# Patient Record
Sex: MALE | Race: WHITE | NOT HISPANIC OR LATINO | Employment: FULL TIME | URBAN - METROPOLITAN AREA
[De-identification: names, ages, dates, MRNs, and addresses within clinical notes are randomized per-mention and may not be internally consistent; named-entity substitution may affect disease eponyms.]

---

## 2021-08-17 ENCOUNTER — IMMUNIZATION (OUTPATIENT)
Dept: FAMILY MEDICINE | Facility: CLINIC | Age: 56
End: 2021-08-17
Payer: OTHER GOVERNMENT

## 2021-08-17 DIAGNOSIS — Z23 NEED FOR VACCINATION: Primary | ICD-10-CM

## 2021-08-17 PROCEDURE — 0031A COVID-19,VECTOR-NR,RS-AD26,PF,0.5 ML DOSE VACCINE (JANSSEN): CPT | Mod: CV19,,, | Performed by: RADIOLOGY

## 2021-08-17 PROCEDURE — 91303 COVID-19,VECTOR-NR,RS-AD26,PF,0.5 ML DOSE VACCINE (JANSSEN): ICD-10-PCS | Mod: ,,, | Performed by: RADIOLOGY

## 2021-08-17 PROCEDURE — 0031A COVID-19,VECTOR-NR,RS-AD26,PF,0.5 ML DOSE VACCINE (JANSSEN): ICD-10-PCS | Mod: CV19,,, | Performed by: RADIOLOGY

## 2021-08-17 PROCEDURE — 91303 COVID-19,VECTOR-NR,RS-AD26,PF,0.5 ML DOSE VACCINE (JANSSEN): CPT | Mod: ,,, | Performed by: RADIOLOGY

## 2022-07-05 ENCOUNTER — OFFICE VISIT (OUTPATIENT)
Dept: UROLOGY | Facility: CLINIC | Age: 57
End: 2022-07-05
Payer: COMMERCIAL

## 2022-07-05 DIAGNOSIS — R31.29 MICROSCOPIC HEMATURIA: Primary | ICD-10-CM

## 2022-07-05 DIAGNOSIS — R97.20 ELEVATED PSA: ICD-10-CM

## 2022-07-05 LAB
BILIRUB SERPL-MCNC: ABNORMAL MG/DL
BLOOD URINE, POC: ABNORMAL
CLARITY, POC UA: CLEAR
COLOR, POC UA: YELLOW
GLUCOSE UR QL STRIP: ABNORMAL
KETONES UR QL STRIP: ABNORMAL
LEUKOCYTE ESTERASE URINE, POC: ABNORMAL
NITRITE, POC UA: ABNORMAL
PH, POC UA: 5.5
PROTEIN, POC: ABNORMAL
SPECIFIC GRAVITY, POC UA: 1
UROBILINOGEN, POC UA: 0.2

## 2022-07-05 PROCEDURE — 1159F PR MEDICATION LIST DOCUMENTED IN MEDICAL RECORD: ICD-10-PCS | Mod: CPTII,S$GLB,,

## 2022-07-05 PROCEDURE — 99203 OFFICE O/P NEW LOW 30 MIN: CPT | Mod: S$GLB,,,

## 2022-07-05 PROCEDURE — 81002 POCT URINE DIPSTICK WITHOUT MICROSCOPE: ICD-10-PCS | Mod: S$GLB,,,

## 2022-07-05 PROCEDURE — 88112 CYTOPATH CELL ENHANCE TECH: CPT | Mod: 26,,, | Performed by: PATHOLOGY

## 2022-07-05 PROCEDURE — 87086 URINE CULTURE/COLONY COUNT: CPT

## 2022-07-05 PROCEDURE — 88112 PR  CYTOPATH, CELL ENHANCE TECH: ICD-10-PCS | Mod: 26,,, | Performed by: PATHOLOGY

## 2022-07-05 PROCEDURE — 99999 PR PBB SHADOW E&M-NEW PATIENT-LVL II: CPT | Mod: PBBFAC,,,

## 2022-07-05 PROCEDURE — 99203 PR OFFICE/OUTPT VISIT, NEW, LEVL III, 30-44 MIN: ICD-10-PCS | Mod: S$GLB,,,

## 2022-07-05 PROCEDURE — 1159F MED LIST DOCD IN RCRD: CPT | Mod: CPTII,S$GLB,,

## 2022-07-05 PROCEDURE — 81002 URINALYSIS NONAUTO W/O SCOPE: CPT | Mod: S$GLB,,,

## 2022-07-05 PROCEDURE — 99999 PR PBB SHADOW E&M-NEW PATIENT-LVL II: ICD-10-PCS | Mod: PBBFAC,,,

## 2022-07-05 PROCEDURE — 88112 CYTOPATH CELL ENHANCE TECH: CPT | Performed by: PATHOLOGY

## 2022-07-05 NOTE — PROGRESS NOTES
[facility] Note  Staff: LOBO Rodriguez    PCP: None    Chief Complaint: Elevated PSA    Subjective:        HPI: Naman Oakes is a 56 y.o. male NEW PATIENT presents today to establish care and for evaluation of an elevated PSA. [place] and he [place] his labs with him. His PSA 12/2021 was 1.7 and then recently on 5/16/2022 was 5.94. He denies any urinary complaints such as dysuria, urgency, frequency, gross hematuria, and feeling of incomplete bladder emptying. He does have a family history of prostate cancer, his father. He also states at his last labs he had microscopic hematuria. He is currently not taking any bladder or prostate medications.     Questions asked the pt during ov today:  Urgency: No, urge incontinence? No  NTF: 1-2x night  Dysuria: No  Gross Hematuria:No  Straining:No, Hesistancy:No, Intermittency:No}, Weak stream:No    Last PSA Screening: No results found for: PSA, PSADIAG    History of Kidney Stones?:  No    Constipation issues?:  No    REVIEW OF SYSTEMS:  Review of Systems   Constitutional: Negative.  Negative for chills and fever.   HENT: Negative.    Eyes: Negative.    Respiratory: Negative.    Cardiovascular: Negative.    Gastrointestinal: Negative.  Negative for abdominal pain, nausea and vomiting.   Genitourinary: Negative.  Negative for dysuria, flank pain, frequency, hematuria and urgency.   Musculoskeletal: Negative.  Negative for back pain.   Skin: Negative.    Neurological: Negative.    Endo/Heme/Allergies: Negative.    Psychiatric/Behavioral: Negative.        PMHx:  History reviewed. No pertinent past medical history.    PSHx:  History reviewed. No pertinent surgical history.    Fam Hx:   malignancies: Yes - father prostate cancer    kidney stones: No     Soc Hx:  , lives in [place]    Allergies:  Patient has no known allergies.    Medications: reviewed     Objective:   There were no vitals filed for this  visit.    Physical Exam  Constitutional:       Appearance: Normal appearance.   HENT:      Head: Normocephalic.      Mouth/Throat:      Mouth: Mucous membranes are moist.   Eyes:      Conjunctiva/sclera: Conjunctivae normal.   Pulmonary:      Effort: Pulmonary effort is normal.   Abdominal:      General: There is no distension.      Palpations: Abdomen is soft.      Tenderness: There is no abdominal tenderness. There is no right CVA tenderness or left CVA tenderness.   Musculoskeletal:         General: Normal range of motion.      Cervical back: Normal range of motion.   Skin:     General: Skin is warm.   Neurological:      Mental Status: He is alert and oriented to person, place, and time.   Psychiatric:         Mood and Affect: Mood normal.         Behavior: Behavior normal.          EXAM performed by me in office today:  Inspection of anus and perineum normal  TYRONE: 30g prostate gland without nodules, masses, tenderness. SV not palpable. Normal sphincter tone.   No hemorrhoids visible    LABS REVIEW:  UA today:  Color:Clear, Yellow  Spec. Grav.  <1.005  PH  5.5  Negative for leukocytes, nitrates, protein, glucose, ketones, urobili, bili  Small blood    Assessment:       1. Microscopic hematuria    2. Elevated PSA          Plan:     1. With normal TYRONE, pt would like to observe now. Will repeat PSA in 6 months  2. Urine sent for culture and cytology for micro hematuria    F/u  6 months after PSA    [facility]:  Pending    LOBO Rodriguez

## 2022-07-06 LAB
FINAL PATHOLOGIC DIAGNOSIS: NORMAL
Lab: NORMAL

## 2022-07-07 LAB — BACTERIA UR CULT: NO GROWTH

## 2023-01-04 ENCOUNTER — LAB VISIT (OUTPATIENT)
Dept: LAB | Facility: HOSPITAL | Age: 58
End: 2023-01-04
Payer: COMMERCIAL

## 2023-01-04 DIAGNOSIS — R97.20 ELEVATED PSA: ICD-10-CM

## 2023-01-04 LAB — COMPLEXED PSA SERPL-MCNC: 1.5 NG/ML (ref 0–4)

## 2023-01-04 PROCEDURE — 36415 COLL VENOUS BLD VENIPUNCTURE: CPT | Mod: PO

## 2023-01-04 PROCEDURE — 84153 ASSAY OF PSA TOTAL: CPT

## 2024-01-16 ENCOUNTER — TELEPHONE (OUTPATIENT)
Dept: FAMILY MEDICINE | Facility: CLINIC | Age: 59
End: 2024-01-16
Payer: COMMERCIAL

## 2024-01-16 NOTE — TELEPHONE ENCOUNTER
----- Message from Jessica Alejandra sent at 1/16/2024  7:39 AM CST -----  Contact: self  Type:  Needs Medical Advice    Who Called: self  Symptoms (please be specific): needs to est care with pcp and needs a hosp f/u appt asap    Would the patient rather a call back or a response via [facility]? call  Best Call Back Number: [phone] (home)     Additional Information: please advise and thank you.

## 2024-01-23 ENCOUNTER — OFFICE VISIT (OUTPATIENT)
Dept: PAIN MEDICINE | Facility: CLINIC | Age: 59
End: 2024-01-23
Payer: COMMERCIAL

## 2024-01-23 ENCOUNTER — HOSPITAL ENCOUNTER (OUTPATIENT)
Dept: RADIOLOGY | Facility: HOSPITAL | Age: 59
Discharge: HOME | End: 2024-01-23
Attending: ANESTHESIOLOGY
Payer: COMMERCIAL

## 2024-01-23 VITALS
HEIGHT: 70 IN | DIASTOLIC BLOOD PRESSURE: 89 MMHG | WEIGHT: 186.81 LBS | HEART RATE: 76 BPM | SYSTOLIC BLOOD PRESSURE: 149 MMHG | BODY MASS INDEX: 26.75 KG/M2

## 2024-01-23 DIAGNOSIS — M54.16 LUMBAR RADICULOPATHY: Primary | ICD-10-CM

## 2024-01-23 DIAGNOSIS — M54.16 LUMBAR RADICULOPATHY: ICD-10-CM

## 2024-01-23 PROCEDURE — 72114 X-RAY EXAM L-S SPINE BENDING: CPT | Mod: 26,,, | Performed by: RADIOLOGY

## 2024-01-23 PROCEDURE — 72114 X-RAY EXAM L-S SPINE BENDING: CPT | Mod: TC,PO

## 2024-01-23 PROCEDURE — 3008F BODY MASS INDEX DOCD: CPT | Mod: CPTII,S$GLB,, | Performed by: ANESTHESIOLOGY

## 2024-01-23 PROCEDURE — 99204 OFFICE O/P NEW MOD 45 MIN: CPT | Mod: S$GLB,,, | Performed by: ANESTHESIOLOGY

## 2024-01-23 PROCEDURE — 1159F MED LIST DOCD IN RCRD: CPT | Mod: CPTII,S$GLB,, | Performed by: ANESTHESIOLOGY

## 2024-01-23 PROCEDURE — 3079F DIAST BP 80-89 MM HG: CPT | Mod: CPTII,S$GLB,, | Performed by: ANESTHESIOLOGY

## 2024-01-23 PROCEDURE — 1160F RVW MEDS BY RX/DR IN RCRD: CPT | Mod: CPTII,S$GLB,, | Performed by: ANESTHESIOLOGY

## 2024-01-23 PROCEDURE — 3077F SYST BP >= 140 MM HG: CPT | Mod: CPTII,S$GLB,, | Performed by: ANESTHESIOLOGY

## 2024-01-23 PROCEDURE — 99999 PR PBB SHADOW E&M-EST. PATIENT-LVL III: CPT | Mod: PBBFAC,,, | Performed by: ANESTHESIOLOGY

## 2024-01-23 RX ORDER — METHYLPREDNISOLONE 4 MG/1
TABLET ORAL
Qty: 1 EACH | Refills: 0 | Status: SHIPPED | OUTPATIENT
Start: 2024-01-23 | End: 2024-02-13

## 2024-01-23 NOTE — PROGRESS NOTES
"[facility] New Patient Evaluation      Referred by: Russ Alba    PCP:     CC:   Chief Complaint   Patient presents with    Leg Pain    Low-back Pain          1/23/2024    10:10 AM   Last 3 PDI Scores   Pain Disability Index (PDI) 30         HPI:   Alvin Oakes is a 58 y.o. male patient who has no past medical history on file. He presents with back pain.    He reports the pain started shortly after he was plunging a toilet for his mother  about 10 days ago.Since that time he has had pain radiating down the left leg past the left knee the left foot.  Today his pain is 6/10, constant, burning, tight, tingling, sharp.  He denies any numbness or weakness.  His pain is worse with sitting, standing, lying, walking and not relieved with anything specific.  He has been taking ibuprofen with some very mild relief.      Pain Intervention History:      Past Spine Surgical History:      Past and current medications:  Antineuropathics:  NSAIDs: ibuprofen   Physical therapy:  Antidepressants:  Muscle relaxers:  Opioids:  Antiplatelets/Anticoagulants:    History:    Current Outpatient Medications:     ibuprofen (ADVIL,MOTRIN) 600 MG tablet, Take 1 tablet (600 mg total) by mouth every 6 (six) hours as needed for Pain., Disp: 20 tablet, Rfl: 0    methylPREDNISolone (MEDROL DOSEPACK) 4 mg tablet, use as directed, Disp: 1 each, Rfl: 0    History reviewed. No pertinent past medical history.    History reviewed. No pertinent surgical history.    History reviewed. No pertinent family history.    Social History     Socioeconomic History    Marital status:    Social History Narrative    ** Merged History Encounter **            Review of patient's allergies indicates:  No Known Allergies    Review of Systems:  12 point review of systems is negative.    Physical Exam:  Vitals:    01/23/24 1012   BP: (!) 149/89   Pulse: 76   Weight: 84.8 kg (186 lb 13.4 oz)   Height: 5' 10" (1.778 m)   PainSc:   6   PainLoc: Back " "    Body mass index is 26.81 kg/m².    Gen: NAD  Psych: mood appropriate for given condition  HEENT: eyes anicteric   CV: RRR  HEENT: anicteric   Respiratory: non-labored, no signs of respiratory distress  Abd: non-distended  Skin: warm, dry and intact.  Gait: No antalgic gait.     Sensory:  Intact and symmetrical to light touch in L1-S1 dermatomes bilaterally.    Motor:     Right Left   L2/3 Iliacus Hip flexion  5  5   L3/4 Qudratus Femoris Knee Extension  5  5   L4/5 Tib Anterior Ankle Dorsiflexion   5  5   L5/S1 Extensor Hallicus Longus Great toe extension  5  5   S1/S2 Gastroc/Soleus Plantar Flexion  5  5      Right Left                  Patellar DTR 2+ 0   Achilles DTR 2+ 2+                      Labs:  No results found for: "LABA1C", "HGBA1C"    No results found for: "WBC", "HGB", "HCT", "MCV", "PLT"        Imaging:  CT lumbar spine 1/13/24  FINDINGS:  Minimal grade 1 anterolisthesis of L3 on L4, 2 mm.  Minimal grade 1 retrolisthesis of L5 on S1, 1-2 mm.  Vertebral body heights are maintained.  No acute fracture or osseous destructive process.  Small Schmorl's nodes noted at several levels.  Mild disc height loss at several levels, most advanced at L3-L4.  Multilevel lumbar spondylosis with small posterior disc bulges and facet arthropathy.  Findings result in mild spinal canal stenosis at L3-L4 and mild bilateral neural foraminal narrowing from L3-L4 to L5-S1.     Paraspinal soft tissues demonstrate no acute abnormality.  Colonic diverticulosis noted.    U/S LLE 1/13/24  Impression:  No evidence of deep venous thrombosis in the left lower extremity.    Assessment:   Problem List Items Addressed This Visit    None  Visit Diagnoses       Lumbar radiculopathy    -  Primary    Relevant Medications    methylPREDNISolone (MEDROL DOSEPACK) 4 mg tablet    Other Relevant Orders    X-Ray Lumbar Complete Including Flex And Ext              Alvin Oakes is a 58 y.o. male patient who has no past medical history on file. " He presents with back pain.    He reports the pain started shortly after he was plunging a toilet for his mother  about 10 days ago.Since that time he has had pain radiating down the left leg past the left knee the left foot.  Today his pain is 6/10, constant, burning, tight, tingling, sharp.  He denies any numbness or weakness.  His pain is worse with sitting, standing, lying, walking and not relieved with anything specific.  He has been taking ibuprofen with some very mild relief.    -   On exam he has full strength in his lower extremities and intact sensation to light touch bilateral L2-S1.  He has an absent left patellar DTR  -  he went to the ER if her symptoms and at that time they got an ultrasound of the left lower extremity was negative for DVT.  They also going to CT of his lumbar spine that shows some small grade 1 listhesis of L3 on 4 and L5 on S1.  I see multilevel bilateral facet arthropathy.  He may have some mild bilateral foraminal narrowing.  Central canal is difficult to assess but I do not see any obvious disc extrusion  -  I have ordered a lumbar x-ray with flexion-extension review his bony anatomy and rule out any instability of his listhesis  -  I think he is likely suffering from a lumbar radiculopathy due to a bulging disc.  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  He is unable to start a formal physical therapy regimen at 1 location.  I have provided him with physician directed home exercises to start.  I have also prescribed him a Medrol Dosepak for inflammatory pain.  We will have him follow up in 6 weeks.  If he fails to find significant relief then I am going to get a lumbar MRI      : Not applicable    Isauro Macdonald M.D.  Interventional Pain Medicine / Anesthesiology    This note was completed with dictation software and grammatical errors may exist.

## 2024-01-25 ENCOUNTER — TELEPHONE (OUTPATIENT)
Dept: PAIN MEDICINE | Facility: CLINIC | Age: 59
End: 2024-01-25
Payer: COMMERCIAL

## 2024-01-25 NOTE — TELEPHONE ENCOUNTER
----- Message from Brooklynn Reynoso sent at 1/23/2024  4:02 PM CST -----  Regarding: Results  Type:  Test Results    Who Called: Pt    Name of Test (Lab/Mammo/Etc): Xray    Date of Test: 1/23/2024    Ordering Provider: Renae LEAL    Where the test was performed: [facility]     Would the patient rather a call back or a response via [facility]? Call back    Best Call Back Number: [phone]    Additional Information:  Pt would like a call back to discuss his results for xray ------------------- Thank you

## 2024-01-25 NOTE — TELEPHONE ENCOUNTER
----- Message from Luz Marina Carbajal sent at 1/23/2024 12:55 PM CST -----  Contact: patient  Type:  Needs Medical Advice    Who Called: patient     Would the patient rather a call back or a response via [facility]? Call     Best Call Back Number: [phone] (home)      Additional Information: Patient would like to speak with the nurse in regards to a medication question.     Please call to advise

## 2024-02-01 ENCOUNTER — TELEPHONE (OUTPATIENT)
Dept: PAIN MEDICINE | Facility: CLINIC | Age: 59
End: 2024-02-01
Payer: COMMERCIAL

## 2024-02-01 DIAGNOSIS — M54.9 DORSALGIA, UNSPECIFIED: Primary | ICD-10-CM

## 2024-02-01 NOTE — TELEPHONE ENCOUNTER
He has degenerative changes on his x-ray but nothing alarming.  Our plan was for him to maintain home exercises to see if he had improvement in his pain if you need do not we will get a lumbar MRI.  If he is still having significant pain we can go ahead and get his lumbar imaging.  Order placed.

## 2024-02-01 NOTE — TELEPHONE ENCOUNTER
----- Message from Joanne Trinh sent at 1/29/2024  9:03 AM CST -----  Contact: self  Type: Sooner Appointment Request        Caller is requesting a sooner appointment. Caller declined first available appointment listed below. Caller will not accept being placed on the waitlist and is requesting a message be sent to doctor.        Name of Caller: Patient   Best Call Back Number: [phone]  Additional Information: Pt would like a call about his Last X ra y taht was brandone. Plz explain results and advise. Pt finished medicine he was given on his last visit as well still having pain after finishing  medication. Thanks

## 2024-02-16 ENCOUNTER — TELEPHONE (OUTPATIENT)
Dept: FAMILY MEDICINE | Facility: CLINIC | Age: 59
End: 2024-02-16
Payer: COMMERCIAL

## 2024-02-16 NOTE — TELEPHONE ENCOUNTER
----- Message from Jessica Alejandra sent at 2/15/2024  7:30 AM CST -----  Contact: self  Type:  Needs Medical Advice    Who Called: self  Symptoms (please be specific): needs to see a dr for a bp check. Pt will be new. He would lik to be seen next week.   Would the patient rather a call back or a response via [facility]? call  Best Call Back Number: [phone] (home)     Additional Information: please advise and thank you.

## 2024-02-16 NOTE — TELEPHONE ENCOUNTER
----- Message from Talia Light sent at 2/16/2024 11:12 AM CST -----  Regarding: Returning call  Type:  Patient Returning Call    Who Called:  Alvin  Who Left Message for Patient:  Violet  Does the patient know what this is regarding?:  sooner appt  Best Call Back Number:  [phone]    Additional Information:

## 2024-02-16 NOTE — TELEPHONE ENCOUNTER
----- Message from Dustin Ryan sent at 2/16/2024  9:04 AM CST -----  Type:  Sooner Appointment Request    Caller is requesting a sooner appointment.  Caller declined first available appointment listed below.  Caller will not accept being placed on the waitlist and is requesting a message be sent to doctor.    Name of Caller:  pt  When is the first available appointment?   7/08  Symptoms:   est care/high blood pressure  Would the patient rather a call back or a response via [facility]?  Call back  Best Call Back Number:   [phone]  Additional Information:  pl call bk to advise thanks

## 2024-02-16 NOTE — TELEPHONE ENCOUNTER
----- Message from Alana Springer sent at 2/16/2024  4:36 PM CST -----  Contact: self  Type:  Patient Returning Call    Who Called:  Patient  Who Left Message for Patient:  Maria A  Does the patient know what this is regarding?:  yes  Best Call Back Number:  [phone]    Additional Information:  Pt is ret a call.Please call back

## 2024-03-22 PROBLEM — R31.29 MICROSCOPIC HEMATURIA: Status: ACTIVE | Noted: 2024-03-22

## 2024-03-22 PROBLEM — Z00.00 WELLNESS EXAMINATION: Status: ACTIVE | Noted: 2024-03-22

## 2024-03-22 PROBLEM — R03.0 ELEVATED BP WITHOUT DIAGNOSIS OF HYPERTENSION: Status: ACTIVE | Noted: 2024-03-22

## 2024-03-22 PROBLEM — G25.0 ESSENTIAL TREMOR: Status: ACTIVE | Noted: 2024-03-22

## 2024-03-25 ENCOUNTER — TELEPHONE (OUTPATIENT)
Dept: UROLOGY | Facility: CLINIC | Age: 59
End: 2024-03-25
Payer: COMMERCIAL

## 2024-03-25 DIAGNOSIS — R31.29 MICROSCOPIC HEMATURIA: Primary | ICD-10-CM

## 2024-03-25 PROBLEM — Z86.0100 PERSONAL HISTORY OF COLONIC POLYPS: Status: ACTIVE | Noted: 2024-03-25

## 2024-03-25 PROBLEM — Z86.010 PERSONAL HISTORY OF COLONIC POLYPS: Status: ACTIVE | Noted: 2024-03-25

## 2024-03-26 ENCOUNTER — OFFICE VISIT (OUTPATIENT)
Dept: PAIN MEDICINE | Facility: CLINIC | Age: 59
End: 2024-03-26
Payer: COMMERCIAL

## 2024-03-26 VITALS
DIASTOLIC BLOOD PRESSURE: 86 MMHG | HEART RATE: 78 BPM | WEIGHT: 188.5 LBS | BODY MASS INDEX: 27.05 KG/M2 | SYSTOLIC BLOOD PRESSURE: 148 MMHG

## 2024-03-26 DIAGNOSIS — M54.10 RADICULOPATHY WITH LOWER EXTREMITY SYMPTOMS: ICD-10-CM

## 2024-03-26 PROCEDURE — 1159F MED LIST DOCD IN RCRD: CPT | Mod: CPTII,S$GLB,, | Performed by: PHYSICIAN ASSISTANT

## 2024-03-26 PROCEDURE — 99999 PR PBB SHADOW E&M-EST. PATIENT-LVL III: CPT | Mod: PBBFAC,,, | Performed by: PHYSICIAN ASSISTANT

## 2024-03-26 PROCEDURE — 3008F BODY MASS INDEX DOCD: CPT | Mod: CPTII,S$GLB,, | Performed by: PHYSICIAN ASSISTANT

## 2024-03-26 PROCEDURE — 3079F DIAST BP 80-89 MM HG: CPT | Mod: CPTII,S$GLB,, | Performed by: PHYSICIAN ASSISTANT

## 2024-03-26 PROCEDURE — 1160F RVW MEDS BY RX/DR IN RCRD: CPT | Mod: CPTII,S$GLB,, | Performed by: PHYSICIAN ASSISTANT

## 2024-03-26 PROCEDURE — 3077F SYST BP >= 140 MM HG: CPT | Mod: CPTII,S$GLB,, | Performed by: PHYSICIAN ASSISTANT

## 2024-03-26 PROCEDURE — 99213 OFFICE O/P EST LOW 20 MIN: CPT | Mod: S$GLB,,, | Performed by: PHYSICIAN ASSISTANT

## 2024-03-26 NOTE — LETTER
"Alvin Monsalve" Champ was seen and treated in our department on 3/26/2024.  He may return to work on 3/27/2024    If you have any questions or concerns, please don't hesitate to call.      Ginger Kumar PA-C  "

## 2024-03-27 NOTE — PROGRESS NOTES
"[facility] Established Patient      Referred by: Aliza Jacobson    PCP: Noe Oconnell MD    CC:   Chief Complaint   Patient presents with    Low-back Pain          3/26/2024     9:48 AM 1/23/2024    10:10 AM   Last 3 PDI Scores   Pain Disability Index (PDI) 13 30     He was last seen by Dr. Macdonald 1-23-24 and is new to me today.     HPI:     Mr. Esquivel presents today for follow up of lumbar back pain.  In mid January he plunged a toilet and then developed pain.  Pain was initially in the lower lumbar region with radiation into the left leg to the foot.  It was assocaited with tingling.  He tried ibuprofen, medrol taper, home exercise.  Pain persists.  Dr. Macdonald did order an MRI lumbar spine, but he did not schedule it.  He currently has pain I nteh lower back with sitting/ stanging long or with bending.  Left leg pain previously experienced is not as bad and not as frequent.  He describes a "catch" in the left outer thigh at times stopping at the knee.     Initial HPI from 1-23-24:  Alvin Oakes is a 58 y.o. male patient who has no past medical history on file. He presents with back pain.    He reports the pain started shortly after he was plunging a toilet for his mother  about 10 days ago.Since that time he has had pain radiating down the left leg past the left knee the left foot.  Today his pain is 6/10, constant, burning, tight, tingling, sharp.  He denies any numbness or weakness.  His pain is worse with sitting, standing, lying, walking and not relieved with anything specific.  He has been taking ibuprofen with some very mild relief.      Pain Intervention History:      Past Spine Surgical History:      Past and current medications:  Antineuropathics:  NSAIDs: ibuprofen   Physical therapy:  Antidepressants:  Muscle relaxers:  Opioids:  Antiplatelets/Anticoagulants:  Others:  completed medrol taper.     History:  No current outpatient medications on file.    History reviewed. No pertinent past medical " "history.    Past Surgical History:   Procedure Laterality Date    CHOLECYSTECTOMY         Family History   Problem Relation Age of Onset    Hypertension Other     Diabetes Other        Social History     Socioeconomic History    Marital status:    Tobacco Use    Smoking status: Never    Smokeless tobacco: Never   Social History Narrative    ** Merged History Encounter **            Review of patient's allergies indicates:  No Known Allergies    Review of Systems:  12 point review of systems is negative.    Physical Exam:  Vitals:    03/26/24 0948   BP: (!) 148/86   Pulse: 78   Weight: 85.5 kg (188 lb 7.9 oz)   PainSc:   1   PainLoc: Back     Body mass index is 27.05 kg/m².    Gen: NAD  Psych: mood appropriate for given condition  HEENT: eyes anicteric   CV: RRR  HEENT: anicteric   Respiratory: non-labored, no signs of respiratory distress  Abd: non-distended  Skin: warm, dry and intact.  Gait: No antalgic gait.     Sensory:  Intact and symmetrical to light touch in L1-S1 dermatomes bilaterally.    Motor:     Right Left   L2/3 Iliacus Hip flexion  5  5   L3/4 Qudratus Femoris Knee Extension  5  5   L4/5 Tib Anterior Ankle Dorsiflexion   5  5   L5/S1 Extensor Hallicus Longus Great toe extension  5  5   S1/S2 Gastroc/Soleus Plantar Flexion  5  5      Right Left                  Patellar DTR 2+ 0   Achilles DTR 2+ 2+                      Labs:  No results found for: "LABA1C", "HGBA1C"    No results found for: "WBC", "HGB", "HCT", "MCV", "PLT"        Imaging:  CT lumbar spine 1/13/24  FINDINGS:  Minimal grade 1 anterolisthesis of L3 on L4, 2 mm.  Minimal grade 1 retrolisthesis of L5 on S1, 1-2 mm.  Vertebral body heights are maintained.  No acute fracture or osseous destructive process.  Small Schmorl's nodes noted at several levels.  Mild disc height loss at several levels, most advanced at L3-L4.  Multilevel lumbar spondylosis with small posterior disc bulges and facet arthropathy.  Findings result in mild " "spinal canal stenosis at L3-L4 and mild bilateral neural foraminal narrowing from L3-L4 to L5-S1.     Paraspinal soft tissues demonstrate no acute abnormality.  Colonic diverticulosis noted.    U/S LLE 1/13/24  Impression:  No evidence of deep venous thrombosis in the left lower extremity.    Xray lumbar spine 1-23-24:  Surgical clips are noted in the right upper quadrant of the abdomen suggestive cholecystectomy clips. Dextrocurvature to the lumbar spine is noted. No obvious fractures or dislocations are identified. A minimal anterolisthesis is noted of the L3 on L4 vertebral body of 4 mm. Intervertebral disc height loss is noted at the L3-L4 level. Facet changes are noted at the L3-L4 L4-L5 and L5-S1 levels. No significant change in alignment is noted upon flexion and extension.     Assessment:   Problem List Items Addressed This Visit    None  Visit Diagnoses       Radiculopathy with lower extremity symptoms                  Alvin Oakes is a 58 y.o. male patient who present for follow up of lumbar back pain.  In mid January he plunged a toilet and then developed pain.  Pain was initially in the lower lumbar region with radiation into the left leg to the foot.  It was assocaited with tingling.  He tried ibuprofen, medrol taper, home exercise.  Pain persists.  Dr. Macdonald did order an MRI lumbar spine, but he did not schedule it.  He currently has pain I nteh lower back with sitting/ stanging long or with bending.  Left leg pain previously experienced is not as bad and not as frequent.  He describes a "catch" in the left outer thigh at times stopping at the knee.     -   On exam he has full strength in his lower extremities and intact sensation to light touch bilateral L2-S1.  He has an absent left patellar DTR  -  CT of his lumbar spine shows some small grade 1 listhesis of L3 on 4 and L5 on S1.  I see multilevel bilateral facet arthropathy.  He may have some mild bilateral foraminal narrowing.  Central canal is " difficult to assess but I do not see any obvious disc extrusion.  No instability on lumbar xrays.   -   I think he is likely suffering from a lumbar radiculopathy due to a bulging disc.  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  He is unable to start a formal physical therapy regimen at 1 location.  He has been doing home exercise with continued symptoms.    - I recommend proceeding with MRI lumbar spine to further assess and determine if he is a candidate for interventional procedures.      Follow up after imaging.     : Not applicable    Ginger Kumar PA-C  [facility]      This note was completed with dictation software and grammatical errors may exist.

## 2024-03-28 ENCOUNTER — OFFICE VISIT (OUTPATIENT)
Dept: UROLOGY | Facility: CLINIC | Age: 59
End: 2024-03-28
Payer: COMMERCIAL

## 2024-03-28 ENCOUNTER — TELEPHONE (OUTPATIENT)
Dept: UROLOGY | Facility: CLINIC | Age: 59
End: 2024-03-28

## 2024-03-28 VITALS — WEIGHT: 186.94 LBS | HEIGHT: 70 IN | BODY MASS INDEX: 26.76 KG/M2

## 2024-03-28 DIAGNOSIS — R31.29 MICROSCOPIC HEMATURIA: Primary | ICD-10-CM

## 2024-03-28 LAB
BACTERIA #/AREA URNS HPF: NORMAL /HPF
BILIRUBIN, UA POC OHS: NEGATIVE
BLOOD, UA POC OHS: ABNORMAL
CLARITY, UA POC OHS: CLEAR
COLOR, UA POC OHS: YELLOW
GLUCOSE, UA POC OHS: NEGATIVE
KETONES, UA POC OHS: NEGATIVE
LEUKOCYTES, UA POC OHS: NEGATIVE
MICROSCOPIC COMMENT: NORMAL
NITRITE, UA POC OHS: NEGATIVE
PH, UA POC OHS: 6.5
PROTEIN, UA POC OHS: NEGATIVE
RBC #/AREA URNS HPF: 1 /HPF (ref 0–4)
SPECIFIC GRAVITY, UA POC OHS: <=1.005
SQUAMOUS #/AREA URNS HPF: 1 /HPF
UROBILINOGEN, UA POC OHS: 0.2

## 2024-03-28 PROCEDURE — 1160F RVW MEDS BY RX/DR IN RCRD: CPT | Mod: CPTII,S$GLB,,

## 2024-03-28 PROCEDURE — 99214 OFFICE O/P EST MOD 30 MIN: CPT | Mod: S$GLB,,,

## 2024-03-28 PROCEDURE — 81000 URINALYSIS NONAUTO W/SCOPE: CPT | Mod: PO

## 2024-03-28 PROCEDURE — 1159F MED LIST DOCD IN RCRD: CPT | Mod: CPTII,S$GLB,,

## 2024-03-28 PROCEDURE — 81003 URINALYSIS AUTO W/O SCOPE: CPT | Mod: QW,S$GLB,,

## 2024-03-28 PROCEDURE — 99999 PR PBB SHADOW E&M-EST. PATIENT-LVL III: CPT | Mod: PBBFAC,,,

## 2024-03-28 PROCEDURE — 3008F BODY MASS INDEX DOCD: CPT | Mod: CPTII,S$GLB,,

## 2024-03-28 NOTE — TELEPHONE ENCOUNTER
----- Message from Zaire Banegas sent at 3/28/2024 12:04 PM CDT -----  Regarding: advice  Type:  Needs Medical Advice    Who Called: pt    Best Call Back Number: [phone]      Additional Information: pt st his psa is 1.80.  please call to discuss.

## 2024-03-28 NOTE — PROGRESS NOTES
[facility] Note  Staff: LOBO Rodriguez    PCP: MD Anish    Chief Complaint: Microscopic Hematuria    Subjective:        HPI: Alvin Oakes is a 58 y.o. male presents today for evaluation of microscopic hematuria. Recent UA showed blood but he has not had micro UA. He denies gross hematuria. He denies dysuria, fever, flank pain, abd pain, incontinence, and difficulty urinating. He denies constipation. He denies a history of kidney stones. He recently updated PSA 2/29/2024 but these results are not available to review.     Questions asked the pt during ov today:  Urgency: No, urge incontinence? No  NTF: 0-1x night  Dysuria: No  Gross Hematuria:No  Straining:No, Hesistancy:No, Intermittency:No}, Weak stream:No    Last PSA Screening:   Lab Results   Component Value Date    PSADIAG 1.5 01/04/2023       History of Kidney Stones?:  No    Constipation issues?:  No    REVIEW OF SYSTEMS:  Review of Systems   Constitutional: Negative.  Negative for chills and fever.   HENT: Negative.     Eyes: Negative.    Respiratory: Negative.     Cardiovascular: Negative.    Gastrointestinal: Negative.  Negative for abdominal pain, constipation, diarrhea, nausea and vomiting.   Genitourinary: Negative.  Negative for dysuria, flank pain, frequency, hematuria and urgency.   Musculoskeletal: Negative.  Negative for back pain.   Skin: Negative.    Neurological: Negative.    Psychiatric/Behavioral: Negative.         PMHx:  History reviewed. No pertinent past medical history.    PSHx:  Past Surgical History:   Procedure Laterality Date    CHOLECYSTECTOMY         Fam Hx:   malignancies: No    kidney stones: No     Soc Hx:  , lives in [place]    Allergies:  Patient has no known allergies.    Medications: reviewed     Objective:   There were no vitals filed for this visit.    Physical Exam  Constitutional:       Appearance: Normal appearance.   HENT:      Head: Normocephalic.      Mouth/Throat:      Mouth:  Mucous membranes are moist.   Eyes:      Conjunctiva/sclera: Conjunctivae normal.   Pulmonary:      Effort: Pulmonary effort is normal.   Abdominal:      General: There is no distension.      Palpations: Abdomen is soft.      Tenderness: There is no abdominal tenderness.   Musculoskeletal:         General: Normal range of motion.      Cervical back: Normal range of motion.   Skin:     General: Skin is warm.   Neurological:      Mental Status: He is alert and oriented to person, place, and time.   Psychiatric:         Mood and Affect: Mood normal.         Behavior: Behavior normal.           LABS REVIEW:  UA today:  Color:Clear, Yellow  Spec. Grav.  <1.005  PH  6.5  Negative for leukocytes, nitrates, protein, glucose, ketones, urobili, bili  Trace blood    Assessment:       1. Microscopic hematuria          Plan:     Urine sent for micro UA    F/u as needed    [facility]: Active    LOBO Rodriguez

## 2024-06-24 PROBLEM — Z00.00 WELLNESS EXAMINATION: Status: RESOLVED | Noted: 2024-03-22 | Resolved: 2024-06-24

## 2024-10-24 ENCOUNTER — PATIENT MESSAGE (OUTPATIENT)
Dept: RESEARCH | Facility: HOSPITAL | Age: 59
End: 2024-10-24
Payer: COMMERCIAL

## 2024-10-28 PROBLEM — L98.9 SKIN LESION OF LEFT ARM: Status: ACTIVE | Noted: 2024-10-28
